# Patient Record
(demographics unavailable — no encounter records)

---

## 2025-07-21 NOTE — REVIEW OF SYSTEMS
[As Noted in HPI] : as noted in HPI [Skin Lesions] : skin lesion [Negative] : Heme/Lymph [FreeTextEntry9] : no bells [de-identified] : Had a large EM lesion left upper extremity

## 2025-07-21 NOTE — ASSESSMENT
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  Treated appropriately with 14 days doxycycline.  She has no evidence of Lyme arthritis and does not require 30 days and medication was discontinued today.  Patient had no evidence of any other tick related illness.  Discussed tick prevention with appropriate clothing and DEET.  Under separate note her blood pressure was 150/80 and referred back to her medical doctor for follow-up no further workup required no further labs required.  All issues regarding patient's health and medical problems have been discussed. The patient understands and concurs with the treatment plan.

## 2025-07-21 NOTE — CONSULT LETTER
[Dear  ___] : Dear  [unfilled], [Consult Letter:] : I had the pleasure of evaluating your patient, [unfilled]. [Please see my note below.] : Please see my note below. [Consult Closing:] : Thank you very much for allowing me to participate in the care of this patient.  If you have any questions, please do not hesitate to contact me. [Sincerely,] : Sincerely, [FreeTextEntry3] : Brian Abdi MD FACP Diplomates [employer] Board of Internal Medicine and Infectious Diseases [facility] [place]

## 2025-07-21 NOTE — PHYSICAL EXAM
[General Appearance - Alert] : alert [General Appearance - In No Acute Distress] : in no acute distress [Sclera] : the sclera and conjunctiva were normal [PERRL With Normal Accommodation] : pupils were equal in size, round, reactive to light [Extraocular Movements] : extraocular movements were intact [Outer Ear] : the ears and nose were normal in appearance [Oropharynx] : the oropharynx was normal with no thrush [Neck Appearance] : the appearance of the neck was normal [Neck Cervical Mass (___cm)] : no neck mass was observed [Jugular Venous Distention Increased] : there was no jugular-venous distention [Thyroid Diffuse Enlargement] : the thyroid was not enlarged [Auscultation Breath Sounds / Voice Sounds] : lungs were clear to auscultation bilaterally [Heart Rate And Rhythm] : heart rate was normal and rhythm regular [Heart Sounds] : normal S1 and S2 [Heart Sounds Gallop] : no gallops [Murmurs] : no murmurs [Heart Sounds Pericardial Friction Rub] : no pericardial rub [Full Pulse] : the pedal pulses are present [Edema] : there was no peripheral edema [Bowel Sounds] : normal bowel sounds [Abdomen Soft] : soft [Abdomen Tenderness] : non-tender [] : no hepato-splenomegaly [Abdomen Mass (___ Cm)] : no abdominal mass palpated [Costovertebral Angle Tenderness] : no CVA tenderness [No Palpable Adenopathy] : no palpable adenopathy [Musculoskeletal - Swelling] : no joint swelling [Nail Clubbing] : no clubbing  or cyanosis of the fingernails [Motor Tone] : muscle strength and tone were normal [FreeTextEntry1] : Mild residual rash in her aspect left upper extremity.  No satellite lesions [Deep Tendon Reflexes (DTR)] : deep tendon reflexes were 2+ and symmetric [Sensation] : the sensory exam was normal to light touch and pinprick [No Focal Deficits] : no focal deficits

## 2025-07-21 NOTE — HISTORY OF PRESENT ILLNESS
[FreeTextEntry1] : 68 WF No specific med issues last year generalized rash - poison ivy - itchy Developed large erythematous rash LUE Approximately July 8 Hobbies - gardens - no h/o tic bite [place] area - no pets Expanding rash - went to UC and painful elbow Given doxy  got worse to ER Doxycycline 100 bid x 14 days (given for 30 days Copies of blood work shows strongly positive Lyme negative for tick related infections.  Patient's currently asymptomatic now